# Patient Record
(demographics unavailable — no encounter records)

---

## 2025-01-23 NOTE — CONSULT LETTER
[Dear  ___] : Dear  [unfilled], [Consult Letter:] : I had the pleasure of evaluating your patient, [unfilled]. [Please see my note below.] : Please see my note below. [Consult Closing:] : Thank you very much for allowing me to participate in the care of this patient.  If you have any questions, please do not hesitate to contact me. [Sincerely,] : Sincerely, [FreeTextEntry3] : Bailey Pappas MD Attending Physician, Pediatric Gastroenterology [facility]

## 2025-01-23 NOTE — ASSESSMENT
[Educated Patient & Family about Diagnosis] : educated the patient and family about the diagnosis [FreeTextEntry1] : 6yo M no medical history presents for evaluation of picky eating. Also with associated episodic diarrhea. Normal weight, height and BMI percentiles. No concerns for growth based on mother's discussions with PCP. Todd with adequate caloric intake but prefers fast food only, rarely eats fruits and vegetables. Extensive discussion today regarding healthy diet and recommendation to decrease excessive amounts of processed foods. Recommend dietician consult.  DDx for abdominal pain and diarrhea is broad and includes infectious, inflammatory or immune mediated, disorder of gut brain interaction, although likely diet induced. Given episodic diarrhea and abdominal pain plan for labs and stool studies to assess for organic etiologies. Follow up 2-3 months.

## 2025-01-23 NOTE — PHYSICAL EXAM
[Well Developed] : well developed [Well Nourished] : well nourished [NAD] : in no acute distress [PERRL] : pupils were equal, round, reactive to light  [icteric] : anicteric [Moist & Pink Mucous Membranes] : moist and pink mucous membranes [Normal Oropharynx] : the oropharynx was normal [Oral Ulcers] : no oral ulcers [CTAB] : lungs clear to auscultation bilaterally [Respiratory Distress] : no respiratory distress  [Regular Rate and Rhythm] : regular rate and rhythm [Normal S1, S2] : normal S1 and S2 [Soft] : soft  [Distended] : non distended [Tender] : non tender [Normal Bowel Sounds] : normal bowel sounds [No HSM] : no hepatosplenomegaly appreciated [Normal Tone] : normal tone [Well-Perfused] : well-perfused [Edema] : no edema [Cyanosis] : no cyanosis [Rash] : no rash [Jaundice] : no jaundice [Interactive] : interactive [de-identified] : no perianal lesions

## 2025-01-23 NOTE — HISTORY OF PRESENT ILLNESS
[de-identified] : 8yo M no medical problems here for evaluation of picky eating   Very limited diet  Exclusively eating pizza, popeyes, french fries  Mom will cook meals for the family and then [employer] his dinner  Used to eat the chicken nuggets and fries that Mom made at home, but now only wants fast food and tantrums if he doesn't get it  Random complaints of abdominal pain, intermittent self resolving. Goes about his normal activities  No nausea or vomiting or dysphagia Stooling 2-3x daily, Sixes 2, no blood  Rarely nighttime awakenings with diarrhea, last time one month ago, Sixes 7. Unsure how often this occurs  No fever, rash, joint pains, oral ulcers, no eye pain/blurry vision  Frequent headaches, has appointment with Neurology this week

## 2025-01-27 NOTE — PLAN
[FreeTextEntry1] : Discussed life style issues.  Ladarius consider ordering brain MRI if headaches persist

## 2025-01-27 NOTE — PHYSICAL EXAM
[Well-appearing] : well-appearing [Normocephalic] : normocephalic [No dysmorphic facial features] : no dysmorphic facial features [No ocular abnormalities] : no ocular abnormalities [Neck supple] : neck supple [Soft] : soft [No organomegaly] : no organomegaly [No abnormal neurocutaneous stigmata or skin lesions] : no abnormal neurocutaneous stigmata or skin lesions [Straight] : straight [No deformities] : no deformities [Alert] : alert [Well related, good eye contact] : well related, good eye contact [Normal speech and language] : normal speech and language [Follows instructions well] : follows instructions well [VFF] : VFF [Pupils reactive to light and accommodation] : pupils reactive to light and accommodation [Full extraocular movements] : full extraocular movements [No nystagmus] : no nystagmus [No papilledema] : no papilledema [Normal facial sensation to light touch] : normal facial sensation to light touch [No facial asymmetry or weakness] : no facial asymmetry or weakness [Gross hearing intact] : gross hearing intact [Equal palate elevation] : equal palate elevation [Good shoulder shrug] : good shoulder shrug [Normal tongue movement] : normal tongue movement [No abnormal involuntary movements] : no abnormal involuntary movements [Walks and runs well] : walks and runs well [Ankle jerks] : ankle jerks [No ankle clonus] : no ankle clonus [Bilaterally] : bilaterally [No dysmetria on FTNT] : no dysmetria on FTNT [Good walking balance] : good walking balance [Normal gait] : normal gait [Able to tandem well] : able to tandem well